# Patient Record
Sex: MALE | Race: WHITE | URBAN - METROPOLITAN AREA
[De-identification: names, ages, dates, MRNs, and addresses within clinical notes are randomized per-mention and may not be internally consistent; named-entity substitution may affect disease eponyms.]

---

## 2017-08-10 ENCOUNTER — IMPORTED ENCOUNTER (OUTPATIENT)
Dept: URBAN - METROPOLITAN AREA CLINIC 31 | Facility: CLINIC | Age: 57
End: 2017-08-10

## 2017-08-10 PROBLEM — H04.123: Noted: 2017-08-10

## 2017-08-10 PROCEDURE — 92004 COMPRE OPH EXAM NEW PT 1/>: CPT

## 2017-08-10 NOTE — PATIENT DISCUSSION
1.  Dry Eyes OU:  Start artificials tears. Encouraged regular use. 2.  Refractive error - Glasses change optional. 3.  Return for an appointment in 1 year for comprehensive exam. with Dr. Alexandria Bernard.

## 2018-07-17 NOTE — PATIENT DISCUSSION
The patient has occludable angles. Cataract surgery is recommended. The risks, benefits, and alternatives to surgery were discussed. Advised will resolve with surger.

## 2021-07-29 NOTE — PROCEDURE NOTE: SURGICAL
<p>Prior to commencing surgery patient identification, surgical procedure, site, and side were confirmed by Dr. Holly Goode. Following topical proparacaine anesthesia, the patient was positioned at the YAG laser, a contact lens coupled to the cornea with methylcellulose and an axial posterior capsulotomy performed without complication using 3.1 Mj x 32. Excess methylcellulose was washed from the eye, one drop of Alphagan was instilled and the patient returned to the holding area having tolerated the procedure well and without complication. </p><p>MR [id number]</p>

## 2022-04-02 ASSESSMENT — VISUAL ACUITY
OS_CC: 20/25-3
OD_CC: 20/25+2
OU_SC: 20/30
OS_SC: 20/30
OD_SC: 20/30

## 2022-04-02 ASSESSMENT — TONOMETRY
OD_IOP_MMHG: 13
OS_IOP_MMHG: 13

## 2022-04-05 NOTE — PATIENT DISCUSSION
Myasthenia gravis testing and ESR/CRP to [facility]. Explained urgent nature of testing for [facility]. Patient has not seen new PCP yet. Does have diabetes in which he uses insulin.

## 2022-07-09 ENCOUNTER — TELEPHONE ENCOUNTER (OUTPATIENT)
Dept: URBAN - METROPOLITAN AREA CLINIC 121 | Facility: CLINIC | Age: 62
End: 2022-07-09

## 2022-07-10 ENCOUNTER — TELEPHONE ENCOUNTER (OUTPATIENT)
Dept: URBAN - METROPOLITAN AREA CLINIC 121 | Facility: CLINIC | Age: 62
End: 2022-07-10

## 2022-07-10 RX ORDER — LITHIUM CARBONATE 300 MG/1
TABLET ORAL THREE TIMES A DAY
Refills: 0 | Status: ACTIVE | COMMUNITY
Start: 2009-07-08

## 2022-07-10 RX ORDER — FLUOXETINE HCL 20 MG
CAPSULE ORAL ONCE A DAY
Refills: 0 | Status: ACTIVE | COMMUNITY
Start: 2009-07-08
